# Patient Record
Sex: MALE | Race: WHITE | NOT HISPANIC OR LATINO | URBAN - METROPOLITAN AREA
[De-identification: names, ages, dates, MRNs, and addresses within clinical notes are randomized per-mention and may not be internally consistent; named-entity substitution may affect disease eponyms.]

---

## 2017-10-30 ENCOUNTER — OFFICE VISIT - RIVER FALLS (OUTPATIENT)
Dept: FAMILY MEDICINE | Facility: CLINIC | Age: 32
End: 2017-10-30

## 2017-10-30 ASSESSMENT — MIFFLIN-ST. JEOR: SCORE: 1498.29

## 2017-11-07 ENCOUNTER — OFFICE VISIT - RIVER FALLS (OUTPATIENT)
Dept: FAMILY MEDICINE | Facility: CLINIC | Age: 32
End: 2017-11-07

## 2017-11-07 ASSESSMENT — MIFFLIN-ST. JEOR: SCORE: 1501.01

## 2018-08-13 ENCOUNTER — OFFICE VISIT - RIVER FALLS (OUTPATIENT)
Dept: FAMILY MEDICINE | Facility: CLINIC | Age: 33
End: 2018-08-13

## 2019-05-20 ENCOUNTER — OFFICE VISIT - RIVER FALLS (OUTPATIENT)
Dept: FAMILY MEDICINE | Facility: CLINIC | Age: 34
End: 2019-05-20

## 2019-05-20 ASSESSMENT — MIFFLIN-ST. JEOR: SCORE: 1525.5

## 2019-12-05 ENCOUNTER — OFFICE VISIT - RIVER FALLS (OUTPATIENT)
Dept: FAMILY MEDICINE | Facility: CLINIC | Age: 34
End: 2019-12-05

## 2019-12-05 LAB
ALBUMIN UR-MCNC: NEGATIVE G/DL
BILIRUB UR QL STRIP: NEGATIVE
GLUCOSE UR STRIP-MCNC: NEGATIVE MG/DL
HGB UR QL STRIP: NEGATIVE
KETONES UR STRIP-MCNC: NEGATIVE MG/DL
LEUKOCYTE ESTERASE UR QL STRIP: NEGATIVE
NITRATE UR QL: NEGATIVE
PH UR STRIP: 7 [PH] (ref 5–8)
SP GR UR STRIP: 1.01 (ref 1–1.03)

## 2019-12-05 ASSESSMENT — MIFFLIN-ST. JEOR: SCORE: 1526.41

## 2022-02-11 VITALS
WEIGHT: 145.4 LBS | BODY MASS INDEX: 24.22 KG/M2 | SYSTOLIC BLOOD PRESSURE: 114 MMHG | HEIGHT: 65 IN | HEART RATE: 64 BPM | DIASTOLIC BLOOD PRESSURE: 66 MMHG

## 2022-02-11 VITALS
DIASTOLIC BLOOD PRESSURE: 62 MMHG | BODY MASS INDEX: 23.29 KG/M2 | WEIGHT: 139.8 LBS | HEART RATE: 84 BPM | SYSTOLIC BLOOD PRESSURE: 124 MMHG | TEMPERATURE: 97.3 F | DIASTOLIC BLOOD PRESSURE: 76 MMHG | WEIGHT: 139.2 LBS | HEIGHT: 65 IN | HEART RATE: 108 BPM | HEIGHT: 65 IN | BODY MASS INDEX: 23.19 KG/M2 | SYSTOLIC BLOOD PRESSURE: 110 MMHG

## 2022-02-11 VITALS
BODY MASS INDEX: 24.19 KG/M2 | DIASTOLIC BLOOD PRESSURE: 72 MMHG | HEIGHT: 65 IN | SYSTOLIC BLOOD PRESSURE: 118 MMHG | HEART RATE: 56 BPM | WEIGHT: 145.2 LBS

## 2022-02-12 VITALS
BODY MASS INDEX: 23.26 KG/M2 | OXYGEN SATURATION: 99 % | DIASTOLIC BLOOD PRESSURE: 74 MMHG | HEIGHT: 65 IN | SYSTOLIC BLOOD PRESSURE: 118 MMHG | HEART RATE: 66 BPM | TEMPERATURE: 98.4 F

## 2022-02-16 NOTE — PROGRESS NOTES
Patient:   JOHANN WARREN            MRN: [id number]            FIN: [id number]               Age:   32 years     Sex:  Male     :  1985   Associated Diagnoses:   Right otitis media   Author:   Panda Bauer MD      Visit Information      Date of Service: 2018 02:48 pm  Performing Location: [facility]  Encounter#: [id number]      Primary Care Provider (PCP):  Panda Bauer MD    NPI# [id number]      Referring Provider:  Panda Bauer MD    NPI# [id number]      Chief Complaint   2018 3:10 PM CDT    Here for fluid in right ear        Subjective   Chief complaint 2018 3:10 PM CDT    Here for fluid in right ear  .     see chief complaint as noted above and confirmed with the patient      Health Status   Allergies:    Allergic Reactions (Selected)  No known allergies   Medications:  (Selected)   Prescriptions  Prescribed  amoxicillin 875 mg oral tablet: 1 tab(s) ( 875 mg ), PO, BID, # 20 tab(s), 0 Refill(s), Type: Maintenance, Pharmacy: [id number], 1 tab(s) Oral bid,x10 day(s)   Problem list:    All Problems  Tobacco user / 305.1 / Probable  ADD (Attention Deficit Disorder) / 314.00 / Confirmed      Objective   Vital Signs   2018 3:10 PM CDT Temperature Tympanic 98.4 DegF    Peripheral Pulse Rate 66 bpm    Pulse Site Radial artery    Systolic Blood Pressure 118 mmHg    Diastolic Blood Pressure 74 mmHg    Mean Arterial Pressure 89 mmHg    BP Site Right arm    Oxygen Saturation 99 %      Measurements from flowsheet : Measurements   2018 3:10 PM CDT    Height Measured - Standard                65 in     General:  Alert and oriented, No acute distress.    HENT:  Oral mucosa is moist, No pharyngeal erythema, right TM is red and swollen.    Neck:  Supple, No lymphadenopathy.    Respiratory:  Respirations are non-labored.    Psychiatric:  Cooperative, Appropriate mood & affect.       Impression and Plan   Assessment and Plan:          Diagnosis: Right otitis  media ([id number] H66.91).    Orders      (Selected)   Prescriptions  Prescribed  amoxicillin 875 mg oral tablet: 1 tab(s) ( 875 mg ), PO, BID, # 20 tab(s), 0 Refill(s), Type: Maintenance, Pharmacy: [id number], 1 tab(s) Oral bid,x10 day(s).     Reviewed expected course, what to watch for and when to return..     .

## 2022-02-16 NOTE — PROGRESS NOTES
Patient:   JOHANN WARREN            MRN: [id number]            FIN: [id number]               Age:   31 years     Sex:  Male     :  1985   Associated Diagnoses:   Chest wall pain   Author:   Panda Bauer MD      Chief Complaint   2017 3:47 PM CST    c/o chest pain. Get shortness of breath when he walks up stairs.      History of Present Illness   see chief complaint as noted above and confirmed with the patient   31 year old male following up chest pain    10/30/17: Here with about 6 weeks of anterior right sided chest pain  pleuritic in nature  not SOB  denies recent injury  works as a   17: Pain has continued and has not gotten any better or worse. He does notice some SOB when walking up stairs. denies any coughing. He has pain at rest and gets worse with movement. chest x-ray, EKG and lab work where normal       Review of Systems   Constitutional:  Negative.    Respiratory:  Shortness of breath, No cough.    Cardiovascular:       Chest pain: Right sided.    Gastrointestinal:  No nausea, No vomiting.    Integumentary:  No rash.    Neurologic:  Negative.    Psychiatric:  Negative.              Health Status   Allergies:    Allergic Reactions (Selected)  No known allergies   Medications:  (Selected)      Problem list:    All Problems  Tobacco user / ICD-9-.1 / Probable  ADD (Attention Deficit Disorder) / ICD-9-.00 / Confirmed      Histories   Past Medical History:    Active  Tobacco user (305.1)  ADD (Attention Deficit Disorder) (314.00)   Family History:    No family history items have been selected or recorded.   Procedure history:    No active procedure history items have been selected or recorded.   Social History:        Tobacco Assessment: Current            Current, Snuff        Physical Examination   Vital Signs   2017 3:47 PM CST Peripheral Pulse Rate 84 bpm    Systolic Blood Pressure 124 mmHg    Diastolic Blood Pressure 62 mmHg    Mean Arterial  Pressure 83 mmHg      Measurements from flowsheet : Measurements   11/7/2017 3:47 PM CST Height Measured - Standard 65 in    Weight Measured - Standard 139.8 lb    BSA 1.7 m2    Body Mass Index 23.26 kg/m2      General:  Alert and oriented, No acute distress.    Eye:  Pupils are equal, round and reactive to light, Normal conjunctiva.    HENT:  Oral mucosa is moist.    Neck:  Supple.    Respiratory:  Respirations are non-labored.    Cardiovascular:  Normal rate, Regular rhythm, No edema.    Gastrointestinal:  Non-distended.    Musculoskeletal:  Normal gait.    Integumentary:  Warm, No rash.    Psychiatric:  Cooperative, Appropriate mood & affect, Normal judgment.       Review / Management   Results review      Impression and Plan   Diagnosis     Chest wall pain (DFO32-UM R07.89).     Course:  The pain seems clearly to be chest wall. I think costochondritis is most likely. Because of the degree of disability and the persistence of symptoms and duration of the symptoms will order the CT.    Plan:  Will have him get a CT w/ PE protocol done . Reviewed expected course, what to watch for, and when to return.   I, Andra Stallings [facility], acted solely as a scribe for, and in the presence of Dr. Panda Bauer who performed the service..

## 2022-02-16 NOTE — NURSING NOTE
Comprehensive Intake Entered On:  12/5/2019 3:02 PM CST    Performed On:  12/5/2019 2:55 PM CST by Lydia Stallings CMA               Summary   Chief Complaint :   DOT   Weight Measured :   145.4 lb(Converted to: 145 lb 6 oz, 65.95 kg)    Height Measured :   65 in(Converted to: 5 ft 5 in, 165.10 cm)    Body Mass Index :   24.19 kg/m2   Body Surface Area :   1.74 m2   Systolic Blood Pressure :   114 mmHg   Diastolic Blood Pressure :   66 mmHg   Mean Arterial Pressure :   82 mmHg   Peripheral Pulse Rate :   64 bpm   Lydia Stallings CMA - 12/5/2019 2:55 PM CST   Health Status   Allergies Verified? :   Yes   Medication History Verified? :   Yes   Pre-Visit Planning Status :   Completed   Lydia Stallings CMA - 12/5/2019 2:55 PM CST   Consents   Consent for Immunization Exchange :   Consent Granted   Consent for Immunizations to Providers :   Consent Granted   Lydia Stallings CMA - 12/5/2019 2:55 PM CST   Meds / Allergies   (As Of: 12/5/2019 3:02:32 PM CST)   Allergies (Active)   No known allergies  Estimated Onset Date:   Unspecified ; Created By:   Lydia Ayala; Reaction Status:   Active ; Category:   Drug ; Substance:   No known allergies ; Type:   Allergy ; Updated By:   Lydia Ayala; Source:   Paper Chart ; Reviewed Date:   5/21/2019 7:44 PM CDT        Medication List   (As Of: 12/5/2019 3:02:32 PM CST)        Vision Testing POC   Corrective Lenses :   None   Eye, Left Visual Acuity :   20/20   Eye, Right Visual Acuity :   20/15   Eye, Bilateral Visual Acuity :   20/15   Lydia Stallings CMA - 12/5/2019 2:55 PM CST

## 2022-02-16 NOTE — NURSING NOTE
Phone message    PCP:   laura      Time of Call:  0812       Person Calling:  Polly  Phone number:  [phone]    Returned call at: 0828    Note:   Polly LVM stating the pt wants to know results from blood test on Monday and she was hoping to find out for him.  No active SHARIF on file to speak to Polly; called her and asked for number to contact pt.    Pt cell is [phone], [facility].    Called pt cell and M with Sed Rate result of 2 and that letter was also sent out; can call back with questions.    Patient services were notified to add number in pt chart.

## 2022-02-16 NOTE — PROGRESS NOTES
Patient:   JOHANN WARREN            MRN: [id number]            FIN: [id number]               Age:   33 years     Sex:  Male     :  1985   Associated Diagnoses:   Encounter for examination required by Department of Transportation (DOT)   Author:   Panda Bauer MD      Results Review   General results   Today's results   2019 3:06 PM CST UA pH 7.0    UA Specific Gravity 1.015    UA Glucose NEGATIVE    UA Bilirubin NEGATIVE    UA Ketones NEGATIVE    U Occult Blood NEGATIVE    UA Protein NEGATIVE    UA Nitrite NEGATIVE    UA Leuk Est NEGATIVE    Lab Report [facility] # [id number]   2019 2:55 PM CST Height Measured - Standard 65 in    Weight Measured - Standard 145.4 lb    BSA 1.74 m2    Body Mass Index 24.19 kg/m2    Peripheral Pulse Rate 64 bpm    Systolic Blood Pressure 114 mmHg    Diastolic Blood Pressure 66 mmHg    Mean Arterial Pressure 82 mmHg    Allergies Verified? Yes    Medication History Verified? Yes    Pre-Visit Planning Status Completed    Corrective Lenses None    Eye, Right Visual Acuity 20/15    Eye, Left Visual Acuity 20/20    Eye, Bilateral Visual Acuity 20/15    Chief Complaint DOT    Consent for Immunization Exchange Consent Granted    Consent for Immunizations to Providers Consent Granted    Comprehensive Intake Form Comprehensive Intake Form    Intake Form Comprehensive Intake         Health Status   Allergies:    Allergic Reactions (Selected)  No known allergies   Medications:  (Selected)      Problem list:    All Problems  Tobacco user / 305.1 / Probable  ADD (Attention Deficit Disorder) / 314.00 / Confirmed      Subjective   Problem:  Please see scanned in copy of DOT physical      Objective   Vital Signs   2019 2:55 PM CST Peripheral Pulse Rate 64 bpm    Systolic Blood Pressure 114 mmHg    Diastolic Blood Pressure 66 mmHg    Mean Arterial Pressure 82 mmHg      Measurements from flowsheet : Measurements   2019 2:55 PM CST Height Measured - Standard 65 in    Weight  Measured - Standard 145.4 lb    BSA 1.74 m2    Body Mass Index 24.19 kg/m2         Plan   Impression and Plan:  Orders   .    Diagnosis     Encounter for examination required by Department of Transportation (DOT) ([id number] Z02.89).     .    2 year certificate

## 2022-02-16 NOTE — PROGRESS NOTES
Chief Complaint    c/o chest discomfort for past couple of months. denies cough, SOB, anxiety issues. pain located on right side chest.  History of Present Illness      Here with about 6 weeks of anterior right sided chest pain      pleuritic in nature      not SOB      denies recent injury      works as a   Review of Systems      See HPI, rest of ROS is negative  Physical Exam   Vitals & Measurements    T: 97.3(Tympanic)  HR: 108(Peripheral)  BP: 110/76     HT: 65 in  WT: 139.2 lb  BMI: 23.16           General:  Alert and oriented, No acute distress.            Eye:  Pupils are equal, round and reactive to light, Extraocular movements are intact, Normal conjunctiva.            HENT:  Normocephalic, Tympanic membranes are clear, Oral mucosa is moist, No pharyngeal erythema, No sinus tenderness.            Neck:  Supple, Non-tender, No carotid bruit, No lymphadenopathy, No thyromegaly.            Respiratory:  Lungs are clear to auscultation, Respirations are non-labored, Breath sounds are equal, right anterior chest wall tenderness.            Cardiovascular:  Normal rate, Regular rhythm, No murmur, No gallop            Gastrointestinal:  Soft, Non-tender           Musculoskeletal:  Normal range of motion, Normal strength, No tenderness, No swelling, No deformity.            Integumentary:  Warm, Dry, No rash.            Neurologic:  Alert, Oriented, Normal sensory, Normal motor function, No focal deficits.            Psychiatric:  Cooperative, Appropriate mood & affect.   Assessment/Plan       Right-sided chest pain        Discussed possible causes of his pain.  Advised more frequent use of ibuprofen.  Will await results from radiology on his chest x-ray.  Sedimentation rate ordered today.  Patient Information     Name:JOHANN WARREN      Address:      [address] [address]     Sex:Male     YOB: 1985     [phone]     Emergency  Contact:[facility], CONTACT     MRN:[id number]     FIN:[id number]     Location:[facility]     Date of Service:10/30/2017      Primary Care Physician:       Panda Bauer MD, [phone]  Problem List/Past Medical History    Ongoing     ADD (Attention Deficit Disorder)     Tobacco user    Historical  Medications   No active medications  Allergies    No known allergies  Social History    Smoking Status - 10/30/2017     Current every day smoker     Tobacco - Current, 01/11/2012      Current, Snuff  Immunizations      Vaccine Date Status      tetanus/diphth/pertuss (Tdap) adult/adol 12/21/2006 Recorded  Lab Results   Results (Last 90 days)   No results located.      Diagnostic Results   CXR appears normal

## 2022-02-16 NOTE — PROGRESS NOTES
Patient:   JOHANN WARREN            MRN: [id number]            FIN: [id number]               Age:   33 years     Sex:  Male     :  1985   Associated Diagnoses:   Right otitis externa   Author:   Panda Bauer MD      Visit Information      Date of Service: 2019 03:29 pm  Performing Location: [facility]  Encounter#: [id number]      Primary Care Provider (PCP):  Panda Bauer MD    NPI# [id number]      Referring Provider:  Panda Bauer MD    NPI# [id number]      Chief Complaint   2019 3:36 PM CDT    c/o swelling around right ear and sensitivy        Subjective   Chief complaint 2019 3:36 PM CDT    c/o swelling around right ear and sensitivy  .     see chief complaint as noted above and confirmed with the patient  no trauma, no hearing loss  discomfort goes down neck      Health Status   Allergies:    Allergic Reactions (Selected)  No known allergies   Medications:  (Selected)   Prescriptions  Prescribed  ciprofloxacin 500 mg oral tablet: = 1 tab(s) ( 500 mg ), PO, q12hr, x 7 day(s), # 14 tab(s), 0 Refill(s), Type: Acute, Pharmacy: [id number], 1 tab(s) Oral q12 hrs,x7 day(s)   Problem list:    All Problems  Tobacco user / 305.1 / Probable  ADD (Attention Deficit Disorder) / 314.00 / Confirmed      Objective   Vital Signs   2019 3:36 PM CDT Peripheral Pulse Rate 56 bpm  LOW    Systolic Blood Pressure 118 mmHg    Diastolic Blood Pressure 72 mmHg    Mean Arterial Pressure 87 mmHg      Measurements from flowsheet : Measurements   2019 3:36 PM CDT Height Measured - Standard 65 in    Weight Measured - Standard 145.2 lb    BSA 1.74 m2    Body Mass Index 24.16 kg/m2      General:  Alert and oriented, No acute distress.    Integumentary:  pink color and swelling just anterior to ear and down neck several inches  ear canal is swollen and red  no drainage.    Psychiatric:  Cooperative, Appropriate mood & affect, Normal judgment.       Impression and Plan    Assessment and Plan:          Diagnosis: Right otitis externa ([id number] H60.91).    Orders      (Selected)   Prescriptions  Prescribed  ciprofloxacin 500 mg oral tablet: = 1 tab(s) ( 500 mg ), PO, q12hr, x 7 day(s), # 14 tab(s), 0 Refill(s), Type: Acute, Pharmacy: [id number], 1 tab(s) Oral q12 hrs,x7 day(s).     Reviewed expected course, what to watch for and when to return..     .

## 2022-02-16 NOTE — NURSING NOTE
Comprehensive Intake Entered On:  5/20/2019 3:39 PM CDT    Performed On:  5/20/2019 3:36 PM CDT by Lydia Stallings CMA               Summary   Chief Complaint :   c/o swelling around right ear and sensitivy   Weight Measured :   145.2 lb(Converted to: 145 lb 3 oz, 65.86 kg)    Height Measured :   65 in(Converted to: 5 ft 5 in, 165.10 cm)    Body Mass Index :   24.16 kg/m2   Body Surface Area :   1.74 m2   Systolic Blood Pressure :   118 mmHg   Diastolic Blood Pressure :   72 mmHg   Mean Arterial Pressure :   87 mmHg   Peripheral Pulse Rate :   56 bpm (LOW)    Lydia Stallings CMA - 5/20/2019 3:36 PM CDT   Health Status   Allergies Verified? :   Yes   Medication History Verified? :   Yes   Pre-Visit Planning Status :   Completed   Tobacco Use? :   Current every day smoker   Lydia Stallings CMA - 5/20/2019 3:36 PM CDT   Consents   Consent for Immunization Exchange :   Consent Granted   Consent for Immunizations to Providers :   Consent Granted   Lydia Stallings CMA - 5/20/2019 3:36 PM CDT   Meds / Allergies   (As Of: 5/20/2019 3:39:59 PM CDT)   Allergies (Active)   No known allergies  Estimated Onset Date:   Unspecified ; Created By:   Lydia Ayala; Reaction Status:   Active ; Category:   Drug ; Substance:   No known allergies ; Type:   Allergy ; Updated By:   Lydia Ayala; Source:   Paper Chart ; Reviewed Date:   8/13/2018 7:27 PM CDT        Medication List   (As Of: 5/20/2019 3:39:59 PM CDT)   Prescription/Discharge Order    amoxicillin  :   amoxicillin ; Status:   Processing ; Ordered As Mnemonic:   amoxicillin 875 mg oral tablet ; Ordering Provider:   Panda Bauer MD; Action Display:   Complete ; Catalog Code:   amoxicillin ; Order Dt/Tm:   5/20/2019 3:38:47 PM